# Patient Record
Sex: MALE | Race: BLACK OR AFRICAN AMERICAN | NOT HISPANIC OR LATINO | Employment: FULL TIME | URBAN - METROPOLITAN AREA
[De-identification: names, ages, dates, MRNs, and addresses within clinical notes are randomized per-mention and may not be internally consistent; named-entity substitution may affect disease eponyms.]

---

## 2024-09-16 ENCOUNTER — APPOINTMENT (OUTPATIENT)
Dept: PRIMARY CARE | Facility: HOSPITAL | Age: 54
End: 2024-09-16
Payer: COMMERCIAL

## 2024-09-26 ENCOUNTER — APPOINTMENT (OUTPATIENT)
Dept: PRIMARY CARE | Facility: CLINIC | Age: 54
End: 2024-09-26
Payer: COMMERCIAL

## 2024-09-26 ENCOUNTER — LAB (OUTPATIENT)
Dept: LAB | Facility: LAB | Age: 54
End: 2024-09-26
Payer: COMMERCIAL

## 2024-09-26 VITALS
WEIGHT: 194.6 LBS | HEART RATE: 60 BPM | OXYGEN SATURATION: 97 % | BODY MASS INDEX: 27.24 KG/M2 | HEIGHT: 71 IN | DIASTOLIC BLOOD PRESSURE: 80 MMHG | TEMPERATURE: 98.1 F | SYSTOLIC BLOOD PRESSURE: 124 MMHG

## 2024-09-26 DIAGNOSIS — Z11.59 NEED FOR HEPATITIS C SCREENING TEST: ICD-10-CM

## 2024-09-26 DIAGNOSIS — Z12.5 PROSTATE CANCER SCREENING: ICD-10-CM

## 2024-09-26 DIAGNOSIS — E55.9 HYPOVITAMINOSIS D: ICD-10-CM

## 2024-09-26 DIAGNOSIS — Z11.4 SCREENING FOR HIV (HUMAN IMMUNODEFICIENCY VIRUS): ICD-10-CM

## 2024-09-26 DIAGNOSIS — Z00.00 HEALTHCARE MAINTENANCE: Primary | ICD-10-CM

## 2024-09-26 DIAGNOSIS — Z00.00 HEALTHCARE MAINTENANCE: ICD-10-CM

## 2024-09-26 PROCEDURE — 1036F TOBACCO NON-USER: CPT | Performed by: INTERNAL MEDICINE

## 2024-09-26 PROCEDURE — 83718 ASSAY OF LIPOPROTEIN: CPT

## 2024-09-26 PROCEDURE — 82465 ASSAY BLD/SERUM CHOLESTEROL: CPT

## 2024-09-26 PROCEDURE — 36415 COLL VENOUS BLD VENIPUNCTURE: CPT

## 2024-09-26 PROCEDURE — 3008F BODY MASS INDEX DOCD: CPT | Performed by: INTERNAL MEDICINE

## 2024-09-26 PROCEDURE — 86803 HEPATITIS C AB TEST: CPT

## 2024-09-26 PROCEDURE — 82306 VITAMIN D 25 HYDROXY: CPT

## 2024-09-26 PROCEDURE — 87389 HIV-1 AG W/HIV-1&-2 AB AG IA: CPT

## 2024-09-26 PROCEDURE — 84153 ASSAY OF PSA TOTAL: CPT

## 2024-09-26 PROCEDURE — 80053 COMPREHEN METABOLIC PANEL: CPT

## 2024-09-26 PROCEDURE — 99386 PREV VISIT NEW AGE 40-64: CPT | Performed by: INTERNAL MEDICINE

## 2024-09-26 ASSESSMENT — COLUMBIA-SUICIDE SEVERITY RATING SCALE - C-SSRS
1. IN THE PAST MONTH, HAVE YOU WISHED YOU WERE DEAD OR WISHED YOU COULD GO TO SLEEP AND NOT WAKE UP?: NO
6. HAVE YOU EVER DONE ANYTHING, STARTED TO DO ANYTHING, OR PREPARED TO DO ANYTHING TO END YOUR LIFE?: NO
2. HAVE YOU ACTUALLY HAD ANY THOUGHTS OF KILLING YOURSELF?: NO

## 2024-09-26 ASSESSMENT — ENCOUNTER SYMPTOMS
CHEST TIGHTNESS: 0
SORE THROAT: 0
NAUSEA: 0
LOSS OF SENSATION IN FEET: 0
CHILLS: 0
DIARRHEA: 0
DEPRESSION: 0
ACTIVITY CHANGE: 0
SINUS PRESSURE: 0
SHORTNESS OF BREATH: 0
WEAKNESS: 0
MYALGIAS: 0
PALPITATIONS: 0
OCCASIONAL FEELINGS OF UNSTEADINESS: 0
AGITATION: 0

## 2024-09-26 ASSESSMENT — PATIENT HEALTH QUESTIONNAIRE - PHQ9
1. LITTLE INTEREST OR PLEASURE IN DOING THINGS: NOT AT ALL
SUM OF ALL RESPONSES TO PHQ9 QUESTIONS 1 AND 2: 0
2. FEELING DOWN, DEPRESSED OR HOPELESS: NOT AT ALL

## 2024-09-26 ASSESSMENT — PAIN SCALES - GENERAL: PAINLEVEL: 8

## 2024-09-26 NOTE — PROGRESS NOTES
"Subjective   Patient ID: Federico Neff is a 53 y.o. male who presents for Physical.  Other than random aches and pains he feels like he is in pretty good shape.  He had a colonoscopy a couple years ago that was completely normal at [facility].  He also had some screening labs done at the [facility] men's clinic at [facility] a few years ago.  He has no risk for diabetes.    HPI     Review of Systems   Constitutional:  Negative for activity change and chills.   HENT:  Negative for congestion, sinus pressure and sore throat.    Respiratory:  Negative for chest tightness and shortness of breath.    Cardiovascular:  Negative for chest pain and palpitations.   Gastrointestinal:  Negative for diarrhea and nausea.   Musculoskeletal:  Negative for myalgias.   Neurological:  Negative for weakness.   Psychiatric/Behavioral:  Negative for agitation and behavioral problems.        Objective   /80 (BP Location: Right arm, Patient Position: Sitting, BP Cuff Size: Small adult)   Pulse 60   Temp 36.7 °C (98.1 °F) (Temporal)   Ht 1.803 m (5' 11\")   Wt 88.3 kg (194 lb 9.6 oz)   SpO2 97%   BMI 27.14 kg/m²     Physical Exam  Constitutional:       Appearance: Normal appearance.   HENT:      Head: Normocephalic and atraumatic.      Nose: Nose normal.      Mouth/Throat:      Mouth: Mucous membranes are moist.   Eyes:      Extraocular Movements: Extraocular movements intact.      Pupils: Pupils are equal, round, and reactive to light.   Cardiovascular:      Rate and Rhythm: Normal rate and regular rhythm.   Pulmonary:      Effort: No respiratory distress.      Breath sounds: No wheezing.   Abdominal:      General: Bowel sounds are normal.      Palpations: Abdomen is soft.   Neurological:      General: No focal deficit present.         Assessment/Plan   Assessment & Plan  Healthcare maintenance  The patient is doing well overall and is in good shape.  We will check screening labs.       Hypovitaminosis " D         Screening for HIV (human immunodeficiency virus)         Need for hepatitis C screening test         Prostate cancer screening

## 2024-09-27 LAB
25(OH)D3 SERPL-MCNC: 19 NG/ML (ref 30–100)
ALBUMIN SERPL BCP-MCNC: 4.7 G/DL (ref 3.4–5)
ALP SERPL-CCNC: 59 U/L (ref 33–120)
ALT SERPL W P-5'-P-CCNC: 14 U/L (ref 10–52)
ANION GAP SERPL CALC-SCNC: 11 MMOL/L (ref 10–20)
AST SERPL W P-5'-P-CCNC: 14 U/L (ref 9–39)
BILIRUB SERPL-MCNC: 0.4 MG/DL (ref 0–1.2)
BUN SERPL-MCNC: 14 MG/DL (ref 6–23)
CALCIUM SERPL-MCNC: 9.8 MG/DL (ref 8.6–10.6)
CHLORIDE SERPL-SCNC: 104 MMOL/L (ref 98–107)
CHOLEST SERPL-MCNC: 168 MG/DL (ref 0–199)
CHOLESTEROL/HDL RATIO: 4.6
CO2 SERPL-SCNC: 29 MMOL/L (ref 21–32)
CREAT SERPL-MCNC: 0.75 MG/DL (ref 0.5–1.3)
EGFRCR SERPLBLD CKD-EPI 2021: >90 ML/MIN/1.73M*2
GLUCOSE SERPL-MCNC: 83 MG/DL (ref 74–99)
HCV AB SER QL: NONREACTIVE
HDLC SERPL-MCNC: 36.5 MG/DL
HIV 1+2 AB+HIV1 P24 AG SERPL QL IA: NONREACTIVE
NON-HDL CHOLESTEROL: 132 MG/DL (ref 0–149)
POTASSIUM SERPL-SCNC: 4.2 MMOL/L (ref 3.5–5.3)
PROT SERPL-MCNC: 7.2 G/DL (ref 6.4–8.2)
PSA SERPL-MCNC: 0.4 NG/ML
SODIUM SERPL-SCNC: 140 MMOL/L (ref 136–145)

## 2025-07-16 ENCOUNTER — HOSPITAL ENCOUNTER (EMERGENCY)
Facility: HOSPITAL | Age: 55
Discharge: HOME | End: 2025-07-16
Payer: COMMERCIAL

## 2025-07-16 VITALS
HEART RATE: 57 BPM | RESPIRATION RATE: 16 BRPM | BODY MASS INDEX: 26.04 KG/M2 | OXYGEN SATURATION: 100 % | SYSTOLIC BLOOD PRESSURE: 135 MMHG | DIASTOLIC BLOOD PRESSURE: 83 MMHG | WEIGHT: 186 LBS | TEMPERATURE: 98.1 F | HEIGHT: 71 IN

## 2025-07-16 DIAGNOSIS — L03.116 CELLULITIS OF LEFT LOWER EXTREMITY: ICD-10-CM

## 2025-07-16 DIAGNOSIS — S80.262A INSECT BITE OF LEFT KNEE, INITIAL ENCOUNTER: Primary | ICD-10-CM
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
## 2025-07-16 PROCEDURE — 2500000001 HC RX 250 WO HCPCS SELF ADMINISTERED DRUGS (ALT 637 FOR MEDICARE OP)

## 2025-07-16 PROCEDURE — 99283 EMERGENCY DEPT VISIT LOW MDM: CPT

## 2025-07-16 RX ORDER — DOXYCYCLINE HYCLATE 100 MG
100 TABLET ORAL ONCE
Status: COMPLETED | OUTPATIENT
Start: 2025-07-16 | End: 2025-07-16

## 2025-07-16 RX ORDER — DOXYCYCLINE 100 MG/1
100 CAPSULE ORAL 2 TIMES DAILY
Qty: 14 CAPSULE | Refills: 0 | Status: SHIPPED | OUTPATIENT
Start: 2025-07-16 | End: 2025-07-23

## 2025-07-16 RX ADMIN — DOXYCYCLINE HYCLATE 100 MG: 100 TABLET, COATED ORAL at 11:37

## 2025-07-16 NOTE — ED PROVIDER NOTES
HPI   CC: Insect Bite     Patient is a 54-year-old male with benign PMH presenting to the ED with concern for an insect bite.  Patient states this morning he was bit by a bug on his left knee.  States it was initially painful but pain has since subsided.  Since he was bit he developed a blister and redness around the site on the anterior left knee.  He has taken ibuprofen prior to coming in and states it has helped with the pain.  Denies any warmth, swelling, tenderness to his knee.  He is still able to fully range his left knee with no pain.  Denies any systemic symptoms including fever, chills, weakness, vomiting.  Denies any immunocompromise including diabetes, cancer, chemotherapy, HIV, or steroid use.  Denies recent antibiotic usage.          ROS: 10-point review of systems was performed and is otherwise negative except as noted in HPI.    Limitations to history: N/A  Independent Historians: Self   External Records Reviewed: Outpatient notes in EMR    Past Medical History: Noncontributory except per HPI     Past Surgical History: Noncontributory except per HPI     Family History: Reviewed and noncontributory     Social History:  Denies tobacco. Denies ETOH. Denies illicit drugs.    RX Allergies[1]    Home Meds:   Current Outpatient Medications   Medication Instructions    doxycycline (VIBRAMYCIN) 100 mg, oral, 2 times daily, Take with at least 8 ounces (large glass) of water, do not lie down for 30 minutes after        Physical Exam     ED Triage Vitals [07/16/25 1110]   Temperature Heart Rate Respirations BP   36.7 °C (98.1 °F) 57 16 135/83      Pulse Ox Temp Source Heart Rate Source Patient Position   100 % Oral Monitor Sitting      BP Location FiO2 (%)     Right arm --         Vitals and nursing note reviewed.   Physical Exam  Constitutional:       General: He is not in acute distress.     Appearance: Normal appearance. He is not ill-appearing, toxic-appearing or diaphoretic.   HENT:      Head: Normocephalic  and atraumatic.      Nose: Nose normal.      Mouth/Throat:      Mouth: Mucous membranes are moist.      Pharynx: Oropharynx is clear. No oropharyngeal exudate or posterior oropharyngeal erythema.     Eyes:      General: No scleral icterus.        Right eye: No discharge.         Left eye: No discharge.      Extraocular Movements: Extraocular movements intact.      Conjunctiva/sclera: Conjunctivae normal.      Pupils: Pupils are equal, round, and reactive to light.       Cardiovascular:      Rate and Rhythm: Normal rate and regular rhythm.      Heart sounds: Normal heart sounds. No murmur heard.     No friction rub. No gallop.      Comments: DP and PT 2+ b/l  Pulmonary:      Effort: Pulmonary effort is normal. No respiratory distress.      Breath sounds: Normal breath sounds. No stridor. No wheezing, rhonchi or rales.   Abdominal:      General: Abdomen is flat. Bowel sounds are normal. There is no distension.      Palpations: Abdomen is soft.      Tenderness: There is no abdominal tenderness. There is no right CVA tenderness, left CVA tenderness, guarding or rebound.     Musculoskeletal:         General: Normal range of motion.      Cervical back: Normal range of motion and neck supple. No rigidity or tenderness.      Comments: There is no joint deformity, crepitus, bruising, swelling, tenderness, erythema, or warmth. Compartments are soft. ROM full to bilateral hip flexion/extension/adduction/abduction/internal rotation/external rotation, knee flexion/extension, ankle dorsi/plantarflexion/inversion/eversion.   Lymphadenopathy:      Cervical: No cervical adenopathy.     Skin:     General: Skin is warm and dry.      Capillary Refill: Capillary refill takes less than 2 seconds.      Comments: 1 cm blister with clear fluid to anterior right knee with surrounding erythema.  Is nontender to palpation.  No purulent discharge.  No expansion of blister with lateral pressure.     Neurological:      General: No focal deficit  present.      Mental Status: He is alert and oriented to person, place, and time.     Psychiatric:         Mood and Affect: Mood normal.         Behavior: Behavior normal.         Diagnostic Results      Labs Reviewed - No data to display      No orders to display       ED Course & MDM   Assessment/Plan:   Medications   doxycycline (Vibra-Tabs) tablet 100 mg (100 mg oral Given 7/16/25 1137)      ED Course as of 07/16/25 1141   Wed Jul 16, 2025   1139 Patient is a 54-year-old male who presents to the ED with concern for a bug bite.  Vital signs are stable, patient is nontoxic-appearing.  He is neurovascularly intact to the left lower extremity.  On exam he does have a 1 cm blister with clear fluid and surrounding erythema.  Negative Nikolsky sign.  Suspect symptoms are likely secondary to bug bite that is now causing cellulitis.  Use shared decision making with patient and advise not to drain blister at this time.  Educated to keep covered.  Will prescribe doxycycline.  Dose was given in ED and prescription was sent to the pharmacy.  Advised patient he can take over-the-counter Zyrtec or Benadryl as needed for itching.  Educated patient that if blister does pop may apply Xeroform which was given in the ED and Vaseline over area.  Advised patient over watch out for signs of worsening infection and to follow-up with PCP within 3 to 5 days to ensure symptoms are improving.  Patient understands and is agreeable with plan. Patient told to return to ED for any new or worsening symptoms. [VS]      ED Course User Index  [VS] Araceli Be PA-C         Diagnoses as of 07/16/25 1141   Insect bite of left knee, initial encounter   Cellulitis of left lower extremity       ED Prescriptions       Medication Sig Dispense Start Date End Date Auth. Provider    doxycycline (Vibramycin) 100 mg capsule Take 1 capsule (100 mg) by mouth 2 times a day for 7 days. Take with at least 8 ounces (large glass) of water, do not lie down  for 30 minutes after 14 capsule 7/16/2025 7/23/2025 Araceli Be PA-C            Chronic Medical Conditions Significantly Affecting Care:      Escalation of Care: Appropriate for outpatient management    Counseling: Spoke with the patient and discussed today´s findings, in addition to providing specific details for the plan of care and expected course.  Patient was given the opportunity to ask questions.    Discussed return precautions and importance of follow-up.  Advised to follow-up with PCP.  Advised to return to the ED for changing or worsening symptoms, new symptoms, complaint specific precautions, and precautions listed on the discharge paperwork.  Educated on the common potential side effects of medications prescribed.    I advised the patient that the emergency evaluation and treatment provided today doesn't end their need for medical care. It is very important that they follow-up with their primary care provider or other specialist as instructed.    The plan of care was mutually agreed upon with the patient. The patient and/or family were given the opportunity to ask questions. All questions asked today in the ED were answered to the best of my ability with today's information.    I specifically advised the patient to return to the ED for changing or worsening symptoms, worrisome new symptoms, or for any complaint specific precautions listed on the discharge paperwork.    Procedure  Procedures         [1] No Known Allergies       Araceli Be PA-C  07/16/25 1143

## 2025-07-16 NOTE — DISCHARGE INSTRUCTIONS
Take doxycycline 100 mg 2 times a day for 7 days  You can apply warm compresses to the area. Soak a clean washcloth in warm water, wring it out so it's damp but not dripping, and place it over the abscess for 10-15 minutes, 3-4 times a day  May take Zyrtec or Benadryl as needed for itching  Please follow with your primary care within 3 to 5 days to ensure symptoms are improving  Return to ED for any new or worsening symptoms

## 2025-10-02 ENCOUNTER — APPOINTMENT (OUTPATIENT)
Dept: PRIMARY CARE | Facility: CLINIC | Age: 55
End: 2025-10-02
Payer: COMMERCIAL